# Patient Record
Sex: MALE | Race: ASIAN | URBAN - METROPOLITAN AREA
[De-identification: names, ages, dates, MRNs, and addresses within clinical notes are randomized per-mention and may not be internally consistent; named-entity substitution may affect disease eponyms.]

---

## 2022-12-05 ENCOUNTER — REFRACTIVE (OUTPATIENT)
Dept: URBAN - METROPOLITAN AREA CLINIC 4 | Facility: CLINIC | Age: 22
End: 2022-12-05

## 2022-12-05 DIAGNOSIS — H53.8 OTHER VISUAL DISTURBANCES: Primary | ICD-10-CM

## 2022-12-05 ASSESSMENT — VISUAL ACUITY
OD: 20/20
OS: 20/20

## 2022-12-05 ASSESSMENT — INTRAOCULAR PRESSURE
OD: 20
OS: 20

## 2022-12-05 ASSESSMENT — KERATOMETRY
OS: 43.75
OD: 43.63

## 2023-06-22 ENCOUNTER — REFRACTIVE (OUTPATIENT)
Dept: URBAN - METROPOLITAN AREA CLINIC 4 | Facility: CLINIC | Age: 23
End: 2023-06-22

## 2023-06-22 DIAGNOSIS — H53.8 OTHER VISUAL DISTURBANCES: Primary | ICD-10-CM

## 2023-06-22 RX ORDER — DIAZEPAM 5 MG/1
5 MG TABLET ORAL
Qty: 3 | Refills: 0 | Status: ACTIVE
Start: 2023-06-22

## 2023-06-22 ASSESSMENT — KERATOMETRY
OD: 43.75
OS: 43.75

## 2023-06-22 ASSESSMENT — INTRAOCULAR PRESSURE
OS: 22
OS: 22
OD: 22
OD: 22

## 2023-06-22 ASSESSMENT — VISUAL ACUITY
OD: 20/20
OS: 20/20

## 2023-06-24 ENCOUNTER — POST-OPERATIVE VISIT (OUTPATIENT)
Dept: URBAN - METROPOLITAN AREA CLINIC 4 | Facility: CLINIC | Age: 23
End: 2023-06-24

## 2023-06-24 DIAGNOSIS — Z48.810 ENCOUNTER FOR SURGICAL AFTERCARE FOLLOWING SURGERY ON A SENSE ORGAN: Primary | ICD-10-CM

## 2023-06-24 PROCEDURE — 99024 POSTOP FOLLOW-UP VISIT: CPT | Performed by: OPHTHALMOLOGY

## 2023-06-24 NOTE — IMPRESSION/PLAN
Impression: S/P LASIK - [facility] OU - 1 Day. Encounter for surgical aftercare following surgery on a sense organ  Z48.810. Plan: POD#1 s/p LASIK both eyes- healing well, flap intact. Continue Pred-Moxi eye drops QID until next visit. Cont. Vitamin C 1,000mg PO QD, sunglasses protection and preservative free artificial tears [id number] while awake until next visit. Patient instructed to continue precautions of not rubbing the eyes, to sleep with protective goggles until the next visit. F/U in 1 week, sooner PRN.

## 2023-06-28 ENCOUNTER — POST-OPERATIVE VISIT (OUTPATIENT)
Dept: URBAN - METROPOLITAN AREA CLINIC 4 | Facility: CLINIC | Age: 23
End: 2023-06-28

## 2023-06-28 DIAGNOSIS — Z48.810 ENCOUNTER FOR SURGICAL AFTERCARE FOLLOWING SURGERY ON A SENSE ORGAN: Primary | ICD-10-CM

## 2023-06-28 PROCEDURE — 99024 POSTOP FOLLOW-UP VISIT: CPT | Performed by: OPHTHALMOLOGY

## 2023-06-28 ASSESSMENT — INTRAOCULAR PRESSURE
OS: 20
OD: 20

## 2023-07-24 ENCOUNTER — POST-OPERATIVE VISIT (OUTPATIENT)
Dept: URBAN - METROPOLITAN AREA CLINIC 4 | Facility: CLINIC | Age: 23
End: 2023-07-24

## 2023-07-24 DIAGNOSIS — Z48.810 ENCOUNTER FOR SURGICAL AFTERCARE FOLLOWING SURGERY ON A SENSE ORGAN: Primary | ICD-10-CM

## 2023-07-24 PROCEDURE — 99024 POSTOP FOLLOW-UP VISIT: CPT | Performed by: OPHTHALMOLOGY

## 2023-07-24 ASSESSMENT — INTRAOCULAR PRESSURE
OS: 16
OD: 17

## 2023-12-11 ENCOUNTER — POST-OPERATIVE VISIT (OUTPATIENT)
Dept: URBAN - METROPOLITAN AREA CLINIC 4 | Facility: CLINIC | Age: 23
End: 2023-12-11

## 2023-12-11 DIAGNOSIS — Z48.810 ENCOUNTER FOR SURGICAL AFTERCARE FOLLOWING SURGERY ON A SENSE ORGAN: Primary | ICD-10-CM

## 2023-12-11 PROCEDURE — 99024 POSTOP FOLLOW-UP VISIT: CPT | Performed by: OPHTHALMOLOGY

## 2023-12-11 ASSESSMENT — KERATOMETRY
OS: 41.00
OD: 40.13

## 2023-12-11 ASSESSMENT — INTRAOCULAR PRESSURE
OS: 15
OD: 12